# Patient Record
Sex: FEMALE | Race: WHITE | URBAN - METROPOLITAN AREA
[De-identification: names, ages, dates, MRNs, and addresses within clinical notes are randomized per-mention and may not be internally consistent; named-entity substitution may affect disease eponyms.]

---

## 2022-01-04 ENCOUNTER — OFFICE VISIT (OUTPATIENT)
Dept: GERIATRIC MEDICINE | Age: 77
End: 2022-01-04
Payer: MEDICARE

## 2022-01-04 DIAGNOSIS — E03.9 HYPOTHYROIDISM, UNSPECIFIED TYPE: ICD-10-CM

## 2022-01-04 DIAGNOSIS — J45.909 UNCOMPLICATED ASTHMA, UNSPECIFIED ASTHMA SEVERITY, UNSPECIFIED WHETHER PERSISTENT: Primary | ICD-10-CM

## 2022-01-04 DIAGNOSIS — M47.9 SPONDYLOSIS: ICD-10-CM

## 2022-01-04 DIAGNOSIS — E78.5 HYPERLIPIDEMIA, UNSPECIFIED HYPERLIPIDEMIA TYPE: ICD-10-CM

## 2022-01-04 DIAGNOSIS — I10 PRIMARY HYPERTENSION: ICD-10-CM
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
## 2022-01-04 DIAGNOSIS — R13.10 DYSPHAGIA, UNSPECIFIED TYPE: ICD-10-CM
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
## 2022-01-04 DIAGNOSIS — K11.7 XEROSTOMIA: ICD-10-CM

## 2022-01-04 PROCEDURE — G8421 BMI NOT CALCULATED: HCPCS | Performed by: NURSE PRACTITIONER

## 2022-01-04 PROCEDURE — G8484 FLU IMMUNIZE NO ADMIN: HCPCS | Performed by: NURSE PRACTITIONER

## 2022-01-04 PROCEDURE — 1090F PRES/ABSN URINE INCON ASSESS: CPT | Performed by: NURSE PRACTITIONER

## 2022-01-04 PROCEDURE — 1123F ACP DISCUSS/DSCN MKR DOCD: CPT | Performed by: NURSE PRACTITIONER

## 2022-01-05 LAB
A/G RATIO: NORMAL
ALBUMIN SERPL-MCNC: 2.9 G/DL
ALP BLD-CCNC: 98 U/L
ALT SERPL-CCNC: 12 U/L
AST SERPL-CCNC: 28 U/L
AVERAGE GLUCOSE: NORMAL
B-TYPE NATRIURETIC PEPTIDE: 2002 PG/ML
BILIRUB SERPL-MCNC: 0.6 MG/DL (ref 0.1–1.4)
BILIRUBIN DIRECT: NORMAL
BILIRUBIN, INDIRECT: NORMAL
BUN BLDV-MCNC: 40 MG/DL
CALCIUM SERPL-MCNC: 9.3 MG/DL
CHLORIDE BLD-SCNC: 102 MMOL/L
CHOLESTEROL, TOTAL: 67 MG/DL
CHOLESTEROL/HDL RATIO: ABNORMAL
CO2: 17 MMOL/L
CREAT SERPL-MCNC: 1.32 MG/DL
GFR CALCULATED: 38.9
GLOBULIN: 3.8
GLUCOSE BLD-MCNC: 78 MG/DL
HBA1C MFR BLD: 4.6 %
HDLC SERPL-MCNC: 23 MG/DL (ref 35–70)
LDL CHOLESTEROL CALCULATED: 28 MG/DL (ref 0–160)
NONHDLC SERPL-MCNC: ABNORMAL MG/DL
POTASSIUM SERPL-SCNC: 3.4 MMOL/L
PROTEIN TOTAL: 6.7 G/DL
SODIUM BLD-SCNC: 135 MMOL/L
TRIGL SERPL-MCNC: 79 MG/DL
TSH SERPL DL<=0.05 MIU/L-ACNC: 0.01 UIU/ML
VLDLC SERPL CALC-MCNC: ABNORMAL MG/DL

## 2022-01-06 RX ORDER — OMEPRAZOLE 20 MG/1
20 CAPSULE, DELAYED RELEASE ORAL DAILY
COMMUNITY
End: 2022-01-07 | Stop reason: SDUPTHER

## 2022-01-06 RX ORDER — LORATADINE 10 MG/1
10 TABLET ORAL DAILY
COMMUNITY

## 2022-01-06 RX ORDER — METOLAZONE 5 MG/1
5 TABLET ORAL SEE ADMIN INSTRUCTIONS
COMMUNITY
End: 2022-01-19

## 2022-01-06 RX ORDER — AZELASTINE HYDROCHLORIDE 137 UG/1
2 SPRAY, METERED NASAL 2 TIMES DAILY
COMMUNITY
End: 2022-01-19

## 2022-01-06 RX ORDER — BUDESONIDE AND FORMOTEROL FUMARATE DIHYDRATE 160; 4.5 UG/1; UG/1
2 AEROSOL RESPIRATORY (INHALATION) 2 TIMES DAILY
COMMUNITY

## 2022-01-06 RX ORDER — VERAPAMIL HYDROCHLORIDE 240 MG/1
240 TABLET, FILM COATED, EXTENDED RELEASE ORAL NIGHTLY
COMMUNITY
End: 2022-01-19

## 2022-01-06 RX ORDER — SALIVA STIMULANT COMB. NO.3
10 SPRAY, NON-AEROSOL (ML) MUCOUS MEMBRANE DAILY PRN
COMMUNITY

## 2022-01-06 RX ORDER — ZINC GLUCONATE 50 MG
50 TABLET ORAL DAILY
COMMUNITY

## 2022-01-06 RX ORDER — ALBUTEROL SULFATE 90 UG/1
2 INHALANT RESPIRATORY (INHALATION) EVERY 4 HOURS PRN
COMMUNITY
End: 2022-01-19

## 2022-01-06 RX ORDER — LANOLIN ALCOHOL/MO/W.PET/CERES
1000 CREAM (GRAM) TOPICAL DAILY
COMMUNITY

## 2022-01-06 RX ORDER — HYDROCODONE BITARTRATE AND ACETAMINOPHEN 5; 325 MG/1; MG/1
1 TABLET ORAL EVERY 6 HOURS PRN
COMMUNITY
End: 2022-01-19

## 2022-01-06 RX ORDER — SENNOSIDES 8.6 MG
1300 CAPSULE ORAL DAILY
COMMUNITY

## 2022-01-06 RX ORDER — DULOXETIN HYDROCHLORIDE 60 MG/1
60 CAPSULE, DELAYED RELEASE ORAL DAILY
COMMUNITY
End: 2022-01-07 | Stop reason: SDUPTHER

## 2022-01-06 RX ORDER — FUROSEMIDE 40 MG/1
40 TABLET ORAL DAILY
COMMUNITY

## 2022-01-06 RX ORDER — BACLOFEN 10 MG/1
10 TABLET ORAL DAILY
COMMUNITY

## 2022-01-06 RX ORDER — VALSARTAN AND HYDROCHLOROTHIAZIDE 80; 12.5 MG/1; MG/1
1 TABLET, FILM COATED ORAL DAILY
COMMUNITY
End: 2022-01-19

## 2022-01-06 RX ORDER — ELECTROLYTES/DEXTROSE
1 SOLUTION, ORAL ORAL DAILY
COMMUNITY
End: 2022-01-19

## 2022-01-06 RX ORDER — IBUPROFEN 400 MG/1
400 TABLET ORAL DAILY
COMMUNITY
End: 2022-01-19

## 2022-01-06 RX ORDER — LEVOTHYROXINE SODIUM 0.1 MG/1
137 TABLET ORAL DAILY
COMMUNITY

## 2022-01-07 DIAGNOSIS — K11.7 XEROSTOMIA: ICD-10-CM

## 2022-01-07 DIAGNOSIS — F32.9 MAJOR DEPRESSIVE DISORDER, REMISSION STATUS UNSPECIFIED, UNSPECIFIED WHETHER RECURRENT: Primary | ICD-10-CM

## 2022-01-07 DIAGNOSIS — K21.9 GASTROESOPHAGEAL REFLUX DISEASE, UNSPECIFIED WHETHER ESOPHAGITIS PRESENT: ICD-10-CM

## 2022-01-07 LAB
HCT VFR BLD CALC: 28.8 % (ref 37–47)
HEMOGLOBIN: 9.4 G/DL (ref 12–16)
MCH RBC QN AUTO: 27.1 PG (ref 27–31.3)
MCHC RBC AUTO-ENTMCNC: 32.6 % (ref 33–37)
MCV RBC AUTO: 83.3 FL (ref 82–100)
PDW BLD-RTO: 17.3 % (ref 11.5–14.5)
PLATELET # BLD: 194 K/UL (ref 130–400)
RBC # BLD: 3.46 M/UL (ref 4.2–5.4)
WBC # BLD: 5 K/UL (ref 4.8–10.8)

## 2022-01-07 RX ORDER — OMEPRAZOLE 20 MG/1
20 CAPSULE, DELAYED RELEASE ORAL DAILY
Qty: 120 CAPSULE | Refills: 0 | Status: SHIPPED | OUTPATIENT
Start: 2022-01-07 | End: 2022-05-07

## 2022-01-07 RX ORDER — DULOXETIN HYDROCHLORIDE 60 MG/1
60 CAPSULE, DELAYED RELEASE ORAL DAILY
Qty: 120 CAPSULE | Refills: 0 | Status: SHIPPED | OUTPATIENT
Start: 2022-01-07 | End: 2022-05-07

## 2022-01-07 RX ORDER — SALIVA STIMULANT COMB. NO.3
SPRAY, NON-AEROSOL (ML) MUCOUS MEMBRANE
Qty: 1 EACH | Refills: 3 | Status: SHIPPED | OUTPATIENT
Start: 2022-01-07 | End: 2022-01-19

## 2022-01-08 LAB
VITAMIN B-12: 1146 PG/ML (ref 232–1245)
VITAMIN D 25-HYDROXY: 36.9 NG/ML (ref 30–100)

## 2022-01-11 PROBLEM — R13.10 DYSPHAGIA: Status: ACTIVE | Noted: 2022-01-11

## 2022-01-11 PROBLEM — E03.9 HYPOTHYROIDISM: Status: ACTIVE | Noted: 2022-01-11

## 2022-01-11 PROBLEM — K11.7 XEROSTOMIA: Status: ACTIVE | Noted: 2022-01-11

## 2022-01-11 PROBLEM — M47.9 SPONDYLOSIS: Status: ACTIVE | Noted: 2022-01-11

## 2022-01-11 PROBLEM — E78.5 HYPERLIPIDEMIA: Status: ACTIVE | Noted: 2022-01-11

## 2022-01-11 PROBLEM — J45.909 UNCOMPLICATED ASTHMA: Status: ACTIVE | Noted: 2022-01-11
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
## 2022-01-11 PROBLEM — I10 PRIMARY HYPERTENSION: Status: ACTIVE | Noted: 2022-01-11

## 2022-01-11 NOTE — PROGRESS NOTES
[address] [address]  Chief Complaint   Patient presents with    Other     F/U New Admission       The patient is being seen today for followup after recent admission to our service with primary diagnosis of asthma, hypothyroidism, essential hypertension, hyperlipidemia, spondylosis, and fall on 12/15. SUBJECTIVE:  Resident states she did not sustain an injury as a result of her fall, and she is doing well. She states she does get confused with all the medications she has to take. She does try and write them out and use the pill reminder box; however, she still finds them confusing. It is to be noted that many of the patient's medications that she is taking are not in the original prescription bottles. When I do inquire where the prescription bottles are in which the medications came in, the resident states she does not know. Otherwise, no concerns. FAMILY AND SOCIAL HISTORY:  Refer to H and P. Past Medical History:   Diagnosis Date    Hyperlipidemia     Hypertension     Hyperthyroidism     Spondylosis of lumbar spine        ALLERGIES:  Savella (milnacipran). MEDICATIONS:  1. Acetaminophen 8-hour extended-release tab, 650 mg 2 tabs p.o. daily. 2.   Azelastine hydrochloride solution 137 mcg spray, 2 sprays both nostrils b.i.d.  3.   Baclofen 10 mg tab p.o. daily. 4.   Calcium 600 mg tablet, 600-400 mg-unit 1 tab p.o. t.i.d.  5.   Central-Jaiime tablet, 1 tab p.o. daily. 6.   Cymbalta capsule, delayed release, 60 mg capsule p.o. daily. 7.   Diovan HCT tab, 80 mg-12.5 mg tab p.o. daily. 8.   Glucosamine-chondroitin tab, 500-400 mg 1 tab p.o. daily. 9.   Ibuprofen 400 mg tab p.o. daily. 10.  Lasix 40 mg tab p.o. daily. 11.  Levothyroxine sodium 137 mcg tab p.o. daily. 12.  Loratadine 10 mg tab p.o. daily. 13.  Metolazone 5 mg tab p.o. every Monday, Wednesday, and Friday for edema. 14.  Norco 5/325 tab p.o. q.6 p.r.n. pain.   15.  Omeprazole delayed-release capsule, 20 mg capsule p.o. daily. 16.  ProAir HFA aerosol solution 108 (90 base) mcg/act 2-puff inhalation orally q.4 p.r.n.  17.  RA Central-Jaimie tablet, 1 tab p.o. daily. 18.  Symbicort aerosol 160/4.5 mcg/act 2-puff inhalation orally b.i.d. 19.  Verapamil hydrochloride extended-release tab, 240 mg tab p.o. daily. 20.  Vitamin A tab 3000 mcg p.o. daily. 21.  Vitamin B12 tab 1000 mcg p.o. daily. 22.  Vitamin D3 tab 5000 international units p.o. daily. 23.  Zinc 50 mg tab p.o. daily. REVIEW OF SYSTEMS:  Resident denies any headache, dizziness, or blurred vision. She denies any fever, chills, sore throat, or cough. She does report having a dry mouth. She denies any chest pain, chest discomfort, or chest palpitations. She denies any abdominal pain, nausea, vomiting, or heartburn. She denies any urinary frequency, urgency, or dysuria. She denies any constipation or diarrhea. She states she is sleeping okay and eating okay. She reports she does have edema to her bilateral lower extremities, but that is chronic. She reports she does lately have difficulty swallowing her food. It feels like it gets stuck. Otherwise, no concerns. PE:  Most recent vital signs:  /67, temp 97.8, heart rate 89, respirations 20, and pulse oxing 99% on room air. Constitutional:  Resident is alert, elderly female, in no apparent distress. Somewhat anxious at her baseline. Cooperative with exam.  Integ:  Pale pink, warm, dry. No visible rashes, bruises or open areas. HEENT:  Normocephalic, atraumatic. Conjunctivae pink. Sclerae nonicteric. Mucous membranes pink, moist.  No oropharyngeal exudate. Neck:  Supple. No cervical or clavicular lymphadenopathy. Cardiovascular:  Heart rate regular rate and rhythm. No murmurs, gallops, or rubs noted. Respiratory:  Lung sounds clear through all fields. Respirations even and nonlabored. No use of accessory muscles with breathing. Abdomen:  Obese, soft, nontender, and nondistended. monitor the resident for overall comfort, function, and safety. Return in about 3 months (around 4/4/2022), or if symptoms worsen or fail to improve, for chronic conditions. Please note this report is partially produced by using speech recognition hardware. It may contain errors related to the system, including grammar, punctuation and spelling as well as words and phrases that may seem inaccurate. For any questions or concerns feel free to contact me for clarification           Electronically Signed By: Almyra Offer. Rocco Nageotte, CNP on 01/10/2022 04:16:00  ______________________________  Almyra Offer. Rocco Nageotte, CNP  [id number]  D: 01/09/2022 17:07:06  T: 01/09/2022 18:59:23    cc: - [facility]

## 2022-01-19 RX ORDER — MAGNESIUM OXIDE 400 MG/1
400 TABLET ORAL 2 TIMES DAILY
COMMUNITY

## 2022-01-19 RX ORDER — METOPROLOL TARTRATE 50 MG/1
50 TABLET, FILM COATED ORAL 2 TIMES DAILY
COMMUNITY

## 2022-01-19 RX ORDER — TAMSULOSIN HYDROCHLORIDE 0.4 MG/1
0.4 CAPSULE ORAL DAILY
COMMUNITY
Start: 2022-01-18 | End: 2022-02-14

## 2022-01-19 RX ORDER — PREDNISONE 10 MG/1
10 TABLET ORAL DAILY
COMMUNITY
Start: 2022-01-18 | End: 2022-01-24

## 2022-01-22 LAB — C DIFF TOXIN/ANTIGEN: NORMAL

## 2022-01-23 ENCOUNTER — OFFICE VISIT (OUTPATIENT)
Dept: GERIATRIC MEDICINE | Age: 77
End: 2022-01-23
Payer: MEDICARE

## 2022-01-23 DIAGNOSIS — J45.909 UNCOMPLICATED ASTHMA, UNSPECIFIED ASTHMA SEVERITY, UNSPECIFIED WHETHER PERSISTENT: ICD-10-CM

## 2022-01-23 DIAGNOSIS — K21.9 GASTROESOPHAGEAL REFLUX DISEASE, UNSPECIFIED WHETHER ESOPHAGITIS PRESENT: ICD-10-CM

## 2022-01-23 DIAGNOSIS — N18.9 CHRONIC KIDNEY DISEASE, UNSPECIFIED CKD STAGE: ICD-10-CM

## 2022-01-23 DIAGNOSIS — F32.9 MAJOR DEPRESSIVE DISORDER, REMISSION STATUS UNSPECIFIED, UNSPECIFIED WHETHER RECURRENT: ICD-10-CM

## 2022-01-23 DIAGNOSIS — E03.9 HYPOTHYROIDISM, UNSPECIFIED TYPE: ICD-10-CM

## 2022-01-23 DIAGNOSIS — I50.9 CONGESTIVE HEART FAILURE, UNSPECIFIED HF CHRONICITY, UNSPECIFIED HEART FAILURE TYPE (HCC): ICD-10-CM

## 2022-01-23 DIAGNOSIS — R60.9 EDEMA, UNSPECIFIED TYPE: ICD-10-CM

## 2022-01-23 DIAGNOSIS — I10 PRIMARY HYPERTENSION: ICD-10-CM

## 2022-01-23 DIAGNOSIS — I48.91 ATRIAL FIBRILLATION, UNSPECIFIED TYPE (HCC): Primary | ICD-10-CM

## 2022-01-23 PROCEDURE — 1123F ACP DISCUSS/DSCN MKR DOCD: CPT | Performed by: NURSE PRACTITIONER

## 2022-01-23 PROCEDURE — 99309 SBSQ NF CARE MODERATE MDM 30: CPT | Performed by: NURSE PRACTITIONER

## 2022-01-23 PROCEDURE — G8484 FLU IMMUNIZE NO ADMIN: HCPCS | Performed by: NURSE PRACTITIONER

## 2022-01-24 LAB
ANION GAP SERPL CALCULATED.3IONS-SCNC: 14 MEQ/L (ref 9–15)
BASOPHILS ABSOLUTE: 0 K/UL (ref 0–0.2)
BASOPHILS RELATIVE PERCENT: 0.4 %
BUN BLDV-MCNC: 14 MG/DL (ref 8–23)
CALCIUM SERPL-MCNC: 10 MG/DL (ref 8.5–9.9)
CHLORIDE BLD-SCNC: 102 MEQ/L (ref 95–107)
CO2: 22 MEQ/L (ref 20–31)
CREAT SERPL-MCNC: 1.1 MG/DL (ref 0.5–0.9)
EOSINOPHILS ABSOLUTE: 0.2 K/UL (ref 0–0.7)
EOSINOPHILS RELATIVE PERCENT: 1.7 %
GFR AFRICAN AMERICAN: 58.1
GFR NON-AFRICAN AMERICAN: 48
GLUCOSE BLD-MCNC: 81 MG/DL (ref 70–99)
HCT VFR BLD CALC: 33.1 % (ref 37–47)
HEMOGLOBIN: 10.7 G/DL (ref 12–16)
LYMPHOCYTES ABSOLUTE: 1.1 K/UL (ref 1–4.8)
LYMPHOCYTES RELATIVE PERCENT: 10.8 %
MCH RBC QN AUTO: 26.5 PG (ref 27–31.3)
MCHC RBC AUTO-ENTMCNC: 32.3 % (ref 33–37)
MCV RBC AUTO: 81.9 FL (ref 82–100)
MONOCYTES ABSOLUTE: 0.6 K/UL (ref 0.2–0.8)
MONOCYTES RELATIVE PERCENT: 5.6 %
NEUTROPHILS ABSOLUTE: 8.4 K/UL (ref 1.4–6.5)
NEUTROPHILS RELATIVE PERCENT: 81.5 %
PDW BLD-RTO: 18.2 % (ref 11.5–14.5)
PLATELET # BLD: 181 K/UL (ref 130–400)
POTASSIUM SERPL-SCNC: 4.5 MEQ/L (ref 3.4–4.9)
RBC # BLD: 4.05 M/UL (ref 4.2–5.4)
SODIUM BLD-SCNC: 138 MEQ/L (ref 135–144)
TSH SERPL DL<=0.05 MIU/L-ACNC: 1.33 UIU/ML (ref 0.44–3.86)
WBC # BLD: 10.3 K/UL (ref 4.8–10.8)

## 2022-01-25 VITALS
HEART RATE: 78 BPM | RESPIRATION RATE: 18 BRPM | WEIGHT: 240 LBS | OXYGEN SATURATION: 97 % | TEMPERATURE: 97.6 F | DIASTOLIC BLOOD PRESSURE: 74 MMHG | SYSTOLIC BLOOD PRESSURE: 124 MMHG

## 2022-01-25 NOTE — PROGRESS NOTES
PATIENT:  Clifton Paula    :  1945    DOS:  2022    [address] [facility]  Chief Complaint   Patient presents with    Follow-Up from [address] [place] [address] Road:  The patient is being seen today for followup after recent admission to this facility with primary diagnoses of AFib with RVR, asthma, major depressive disorder, essential hypertension, CKD, heart failure, GERD, hypothyroidism, and edema. SUBJECTIVE:  Resident offers no concerns. States she would like to go back to her apartment because then she can take her pills the way she wants to and she feels that she moves around more when she is at home. Otherwise, no concerns. FAMILY AND SOCIAL HISTORY:  Refer to H&P. Past Medical History:   Diagnosis Date    Hyperlipidemia     Hypertension     Hyperthyroidism     Spondylosis of lumbar spine      . No family history on file. Social History     Socioeconomic History    Marital status:      Spouse name: Not on file    Number of children: Not on file    Years of education: Not on file    Highest education level: Not on file   Occupational History    Not on file   Tobacco Use    Smoking status: Unknown If Ever Smoked    Smokeless tobacco: Not on file   Substance and Sexual Activity    Alcohol use: Not on file    Drug use: Not on file    Sexual activity: Not on file   Other Topics Concern    Not on file   Social History Narrative    Not on file     Social Determinants of Health     Financial Resource Strain:     Difficulty of Paying Living Expenses: Not on file   Food Insecurity:     Worried About Running Out of Food in the Last Year: Not on file    Shaye of Food in the Last Year: Not on file   Transportation Needs:     Lack of Transportation (Medical): Not on file    Lack of Transportation (Non-Medical):  Not on file   Physical Activity:     Days of Exercise per Week: Not on file    Minutes of Exercise per Session: Not on file   Stress:     Feeling of Stress : Not on file   Social Connections:     Frequency of Communication with Friends and Family: Not on file    Frequency of Social Gatherings with Friends and Family: Not on file    Attends Adventism Services: Not on file    Active Member of Clubs or Organizations: Not on file    Attends Club or Organization Meetings: Not on file    Marital Status: Not on file   Intimate Partner Violence:     Fear of Current or Ex-Partner: Not on file    Emotionally Abused: Not on file    Physically Abused: Not on file    Sexually Abused: Not on file   Housing Stability:     Unable to Pay for Housing in the Last Year: Not on file    Number of [place] in the Last Year: Not on file    Unstable Housing in the Last Year: Not on file       ALLERGIES:  [address] MEDICATIONS:  Acetaminophen 650 mg p.o. daily; baclofen 10 mg p.o. daily; Biotene dry mouth lozenges 10 mL p.o. b.i.d.; calcium 600 mg plus D plus mineral tablet 600-400 mg tablet p.o. daily; Central-Jaimie tablet 1 tablet p.o. daily; Dulcolax suppository 10 mg rectal suppository daily p.r.n., duloxetine HCl delayed release capsule 60 mg capsule p.o. daily; Flomax 0.4 mg capsule p.o. daily x4 weeks, furosemide 40 mg tablet p.o. daily; glucosamine 1 capsule p.o. daily; levothyroxine sodium tablet 100 mcg p.o. daily; loratadine 10 mg tablet p.o. daily; Mag-Ox 400 mg tablet p.o. daily; metoprolol tartrate 50 mg p.o. daily; omeprazole 20 mg delayed release tablet p.o. daily; prednisone 10 mg tablet 1 tablet p.o. for inflammation once a day, 20 mg daily for 3 days, then 10 mg for 3 days, then DC; Symbicort aerosol 160-4.5 mcg/ACT 2 puffs inhalation orally b.i.d.; vitamin A tablet 10,000 IU p.o. daily; vitamin B12 tablet 1000 mcg p.o. daily; vitamin D3 tablet 125 mcg p.o. daily; zinc sulfate 140 mg tablet (50 Zn) 1 tablet p.o. daily. REVIEW OF SYSTEMS:  Resident denies any headache, dizziness, blurred vision, chest pain, or shortness of breath.   She denies any fever, chills, or sore throat. She denies any rashes. She does have bruises on her bilateral upper extremities from lab draws and IVs.  She denies any open areas. She denies any chest pain or chest discomfort. She does report that she can feel palpitations intermittently. She denies any abdominal pain, nausea, vomiting, or changes in her bowel or bladder habits. She reports she is eating well, sleeping well. She has no pain. She does have chronic edema to her bilateral lower extremities, which she feels has increased because she cannot move around like she normally does at her apartment. Her only other concern is she would like to go home back to her apartment. PHYSICAL EXAMINATION:  Most recent vital signs:  /74, temperature 97.6, heart rate 78, respirations 18, pulse oxing 97% on room air, weight 240. CONSTITUTIONAL:  Resident is alert and oriented x3, but forgetful female, in no apparent distress, pleasant and cooperative with examination. INTEGUMENT:  Pink, warm, dry. No rashes or open areas visible. She does have bruises to her bilateral upper extremities of various stages of healing. HEENT:  Normocephalic, atraumatic. External ears within normal limits. Hearing adequate for stated age. Conjunctivae pink. Sclerae nonicteric. Mucous membranes pink, moist.  No oropharyngeal exudates. NECK:  Supple. No cervical or clavicular lymphadenopathy. CARDIOVASCULAR:  Heart rate is regular rate and rhythm. No murmurs, gallops, or rubs noted. RESPIRATORY:  Lung sounds are clear throughout all fields. Respirations even, nonlabored. No use of accessory muscles with breathing. She is currently pulse oxing 97% on room air. ABDOMEN:  Soft, obese, nontender, nondistended. Normoactive bowel sounds. No masses noted. MUSCULOSKELETAL:  No muscle tenderness. No joint discomfort. PV:  Peripheral pulses present.   She does have 1 to 2+ pitting bipedal edema and lymphedema of her bilateral lower extremities. PSYCHOLOGICAL: Mood stable. Affect pleasant. Speech normal rate and tone. ASSESSMENT AND PLAN:  1.   AFib. Resident is currently in a regular rhythm. She has not had any chest pain or chest discomfort. She is on metoprolol for rate control that has been effective for her. She does report intermittently she will feel palpitations, but she is not having any chest discomfort. 2.   Asthma. Resident's respiratory status has been stable since her admission to this facility. We will continue her Symbicort as ordered. 3.   Major depressive disorder. Resident has not had any depressive episodes. We will continue her duloxetine as ordered. 4.   Essential hypertension. Resident's blood pressure is ranging anywhere from 120s to 130s/60s to 70s. She is currently not on any antihypertensive medications. 5.   CKD. Resident's BUN is 26, creatinine is 0.98. We will continue to monitor closely. 6.   Heart failure. Resident does have pitting edema to her feet which she states is new since she has come here. She is on furosemide. I will order ACE wraps for her bilateral lower extremities from toes to knees on in a.m. and off in the p.m. Patient reports she has tried RADHA hose in the past and they were cutting into her skin so she is requesting not to have those again. 7.   GERD. Resident is tolerating her prescribed diet. We will continue her omeprazole as ordered. 8.   Hypothyroidism. Resident's TSH is 0.01. We will continue her Synthroid as ordered. She will continue to follow up with Dr. Loki Gonzales from endocrinology. 9.   Edema. I will order ACE wraps for the resident's bilateral lower extremities knee high on in a.m. and off in the p.m. I have reviewed this resident's medication and treatment plan, as well as, most recent hospital records. I will order BMP, CBC with diff, TSH, vitamin D level in the a.m.   I will have nursing staff do discharge planning as the patient wants to go back to her apartment when she is safely able to. No further changes will be made at this time. Return in about 1 week (around 1/30/2022), or if symptoms worsen or fail to improve. Please note this report is partially produced by using speech recognition hardware. It may contain errors related to the system, including grammar, punctuation and spelling as well as words and phrases that may seem inaccurate. For any questions or concerns feel free to contact me for clarification           Electronically Signed By: Lizeth Reynolds CNP on 01/24/2022 08:22:39  ______________________________  Lizeth Reynolds CNP  [id number]  D: 01/23/2022 51:91:36  T: 01/23/2022 19:30:37    cc: - [facility]

## 2022-01-26 ENCOUNTER — OFFICE VISIT (OUTPATIENT)
Dept: GERIATRIC MEDICINE | Age: 77
End: 2022-01-26
Payer: MEDICARE

## 2022-01-26 DIAGNOSIS — I50.22 CHRONIC SYSTOLIC CONGESTIVE HEART FAILURE (HCC): Primary | ICD-10-CM

## 2022-01-26 DIAGNOSIS — I10 PRIMARY HYPERTENSION: ICD-10-CM

## 2022-01-26 DIAGNOSIS — I48.20 CHRONIC ATRIAL FIBRILLATION (HCC): ICD-10-CM

## 2022-01-26 DIAGNOSIS — R53.1 WEAKNESS: ICD-10-CM

## 2022-01-26 PROCEDURE — 99304 1ST NF CARE SF/LOW MDM 25: CPT | Performed by: INTERNAL MEDICINE

## 2022-01-26 PROCEDURE — 1123F ACP DISCUSS/DSCN MKR DOCD: CPT | Performed by: INTERNAL MEDICINE

## 2022-01-26 PROCEDURE — G8484 FLU IMMUNIZE NO ADMIN: HCPCS | Performed by: INTERNAL MEDICINE

## 2022-01-28 DIAGNOSIS — G89.4 CHRONIC PAIN SYNDROME: Primary | ICD-10-CM

## 2022-01-28 PROBLEM — K21.9 GASTROESOPHAGEAL REFLUX DISEASE: Status: ACTIVE | Noted: 2022-01-28

## 2022-01-28 PROBLEM — I48.91 ATRIAL FIBRILLATION (HCC): Status: ACTIVE | Noted: 2022-01-28

## 2022-01-28 PROBLEM — F32.9 MAJOR DEPRESSIVE DISORDER: Status: ACTIVE | Noted: 2022-01-28

## 2022-01-28 PROBLEM — R60.9 EDEMA: Status: ACTIVE | Noted: 2022-01-28

## 2022-01-28 PROBLEM — I50.9 CONGESTIVE HEART FAILURE (HCC): Status: ACTIVE | Noted: 2022-01-28

## 2022-01-28 PROBLEM — N18.9 CHRONIC KIDNEY DISEASE: Status: ACTIVE | Noted: 2022-01-28

## 2022-01-28 RX ORDER — HYDROCODONE BITARTRATE AND ACETAMINOPHEN 5; 325 MG/1; MG/1
1 TABLET ORAL EVERY 6 HOURS PRN
Qty: 120 TABLET | Refills: 0 | Status: SHIPPED | OUTPATIENT
Start: 2022-01-28 | End: 2022-02-27

## 2022-02-01 LAB
BACTERIA: ABNORMAL /HPF
BILIRUBIN URINE: NEGATIVE
BLOOD, URINE: ABNORMAL
CLARITY: CLEAR
COLOR: YELLOW
CRYSTALS, UA: ABNORMAL /HPF
EPITHELIAL CELLS, UA: ABNORMAL /HPF
GLUCOSE URINE: NEGATIVE MG/DL
KETONES, URINE: NEGATIVE MG/DL
LEUKOCYTE ESTERASE, URINE: ABNORMAL
NITRITE, URINE: POSITIVE
PH UA: 5 (ref 5–9)
PROTEIN UA: NEGATIVE MG/DL
RBC UA: ABNORMAL /HPF (ref 0–2)
SPECIFIC GRAVITY UA: 1.01 (ref 1–1.03)
UROBILINOGEN, URINE: 0.2 E.U./DL
WBC UA: ABNORMAL /HPF (ref 0–5)

## 2022-02-03 ENCOUNTER — VIRTUAL VISIT (OUTPATIENT)
Dept: GERIATRIC MEDICINE | Age: 77
End: 2022-02-03
Payer: MEDICARE

## 2022-02-03 DIAGNOSIS — K52.9 CHRONIC DIARRHEA: Primary | ICD-10-CM

## 2022-02-03 DIAGNOSIS — G89.4 CHRONIC PAIN SYNDROME: ICD-10-CM

## 2022-02-03 DIAGNOSIS — N39.0 URINARY TRACT INFECTION WITHOUT HEMATURIA, SITE UNSPECIFIED: ICD-10-CM

## 2022-02-03 LAB
ORGANISM: ABNORMAL
URINE CULTURE, ROUTINE: ABNORMAL

## 2022-02-03 PROCEDURE — G8484 FLU IMMUNIZE NO ADMIN: HCPCS | Performed by: NURSE PRACTITIONER

## 2022-02-03 PROCEDURE — 1090F PRES/ABSN URINE INCON ASSESS: CPT | Performed by: NURSE PRACTITIONER

## 2022-02-03 PROCEDURE — G8421 BMI NOT CALCULATED: HCPCS | Performed by: NURSE PRACTITIONER

## 2022-02-03 PROCEDURE — 1123F ACP DISCUSS/DSCN MKR DOCD: CPT | Performed by: NURSE PRACTITIONER

## 2022-02-03 RX ORDER — DIPHENOXYLATE HYDROCHLORIDE AND ATROPINE SULFATE 2.5; .025 MG/1; MG/1
1 TABLET ORAL 4 TIMES DAILY PRN
Qty: 40 TABLET | Refills: 0 | Status: CANCELLED | OUTPATIENT
Start: 2022-02-03 | End: 2022-02-13

## 2022-02-03 RX ORDER — DIPHENOXYLATE HYDROCHLORIDE AND ATROPINE SULFATE 2.5; .025 MG/1; MG/1
1 TABLET ORAL 4 TIMES DAILY PRN
Status: CANCELLED | OUTPATIENT
Start: 2022-02-03 | End: 2022-02-13

## 2022-02-04 RX ORDER — DIPHENOXYLATE HYDROCHLORIDE AND ATROPINE SULFATE 2.5; .025 MG/1; MG/1
1 TABLET ORAL 4 TIMES DAILY PRN
Qty: 40 TABLET | Refills: 0 | Status: SHIPPED | OUTPATIENT
Start: 2022-02-04 | End: 2022-02-14

## 2022-02-05 LAB — TSH SERPL DL<=0.05 MIU/L-ACNC: 12.69 UIU/ML (ref 0.44–3.86)

## 2022-02-11 LAB — C DIFF TOXIN/ANTIGEN: NORMAL

## 2022-02-19 ENCOUNTER — OFFICE VISIT (OUTPATIENT)
Dept: GERIATRIC MEDICINE | Age: 77
End: 2022-02-19
Payer: MEDICARE

## 2022-02-19 VITALS
WEIGHT: 253.1 LBS | SYSTOLIC BLOOD PRESSURE: 132 MMHG | BODY MASS INDEX: 40.67 KG/M2 | DIASTOLIC BLOOD PRESSURE: 72 MMHG | RESPIRATION RATE: 17 BRPM | HEART RATE: 76 BPM | OXYGEN SATURATION: 96 % | HEIGHT: 66 IN | TEMPERATURE: 97.7 F

## 2022-02-19 DIAGNOSIS — Z71.89 ACP (ADVANCE CARE PLANNING): ICD-10-CM

## 2022-02-19 DIAGNOSIS — I50.9 CONGESTIVE HEART FAILURE, UNSPECIFIED HF CHRONICITY, UNSPECIFIED HEART FAILURE TYPE (HCC): ICD-10-CM

## 2022-02-19 DIAGNOSIS — R04.0 EPISTAXIS: ICD-10-CM

## 2022-02-19 DIAGNOSIS — Z00.00 INITIAL MEDICARE ANNUAL WELLNESS VISIT: Primary | ICD-10-CM

## 2022-02-19 PROCEDURE — 99497 ADVNCD CARE PLAN 30 MIN: CPT | Performed by: NURSE PRACTITIONER

## 2022-02-19 PROCEDURE — 4040F PNEUMOC VAC/ADMIN/RCVD: CPT | Performed by: NURSE PRACTITIONER

## 2022-02-19 PROCEDURE — 1123F ACP DISCUSS/DSCN MKR DOCD: CPT | Performed by: NURSE PRACTITIONER

## 2022-02-19 PROCEDURE — G8484 FLU IMMUNIZE NO ADMIN: HCPCS | Performed by: NURSE PRACTITIONER

## 2022-02-19 PROCEDURE — G0438 PPPS, INITIAL VISIT: HCPCS | Performed by: NURSE PRACTITIONER

## 2022-02-19 ASSESSMENT — LIFESTYLE VARIABLES
HOW OFTEN DO YOU HAVE A DRINK CONTAINING ALCOHOL: 2-4 TIMES A MONTH
HOW MANY STANDARD DRINKS CONTAINING ALCOHOL DO YOU HAVE ON A TYPICAL DAY: 1 OR 2

## 2022-02-19 ASSESSMENT — PATIENT HEALTH QUESTIONNAIRE - PHQ9
SUM OF ALL RESPONSES TO PHQ QUESTIONS 1-9: 0
SUM OF ALL RESPONSES TO PHQ QUESTIONS 1-9: 0
SUM OF ALL RESPONSES TO PHQ9 QUESTIONS 1 & 2: 0
SUM OF ALL RESPONSES TO PHQ QUESTIONS 1-9: 0
SUM OF ALL RESPONSES TO PHQ QUESTIONS 1-9: 0
2. FEELING DOWN, DEPRESSED OR HOPELESS: 0
1. LITTLE INTEREST OR PLEASURE IN DOING THINGS: 0

## 2022-02-19 NOTE — PATIENT INSTRUCTIONS
Personalized Preventive Plan for Jm Figueroa - 2/19/2022  Medicare offers a range of preventive health benefits. Some of the tests and screenings are paid in full while other may be subject to a deductible, co-insurance, and/or copay. Some of these benefits include a comprehensive review of your medical history including lifestyle, illnesses that may run in your family, and various assessments and screenings as appropriate. After reviewing your medical record and screening and assessments performed today your provider may have ordered immunizations, labs, imaging, and/or referrals for you. A list of these orders (if applicable) as well as your Preventive Care list are included within your After Visit Summary for your review. Other Preventive Recommendations:    · A preventive eye exam performed by an eye specialist is recommended every 1-2 years to screen for glaucoma; cataracts, macular degeneration, and other eye disorders. · A preventive dental visit is recommended every 6 months. · Try to get at least 150 minutes of exercise per week or 10,000 steps per day on a pedometer . · Order or download the FREE \"Exercise & Physical Activity: Your Everyday Guide\" from [employer]. Call 1-522.274.5052 or search [employer] online. · You need 8654-5752 mg of calcium and 9736-2389 IU of vitamin D per day. It is possible to meet your calcium requirement with diet alone, but a vitamin D supplement is usually necessary to meet this goal.  · When exposed to the sun, use a sunscreen that protects against both UVA and UVB radiation with an SPF of 30 or greater. Reapply every 2 to 3 hours or after sweating, drying off with a towel, or swimming. · Always wear a seat belt when traveling in a car. Always wear a helmet when riding a bicycle or motorcycle.

## 2022-02-19 NOTE — PROGRESS NOTES
Medicare Annual Wellness Visit    Brooklynn Urias is here for Medicare [address] was seen today for medicare awv. Diagnoses and all orders for this visit:    Initial Medicare annual wellness visit  -     [address] TO FACE, 1ST 30MIN [70422]    ACP (advance care planning)  -     [place] ADVANCED CARE PLAN FACE TO FACE, 1ST 30MIN [66284]    Congestive heart failure, unspecified HF chronicity, unspecified heart failure type ([address]  -     DC ADVANCED CARE PLAN FACE TO FACE, 1ST 30MIN [98807]    Epistaxis  -     DC ADVANCED CARE PLAN FACE  [facility] [id number]         Recommendations for Preventive Services Due: see orders and patient instructions/AVS.  Recommended screening schedule for the next 5-10 years is provided to the patient in written form: see Patient Instructions/AVS.     Return for Medicare Annual Wellness Visit in 1 year. Reviewed and updated this visit by clinical staff: Allergies  Meds  Problems       Subjective   The following acute and/or chronic problems were also addressed today:  CHF  Epistaxis    Patient's complete Health Risk Assessment and screening values have been reviewed and are found in Flowsheets. The following problems were reviewed today and where indicated follow up appointments were made and/or referrals ordered.     Positive Risk Factor Screenings with Interventions:    Fall Risk:  Do you feel unsteady or are you worried about falling? : (!) yes (Occasionally)  2 or more falls in past year?: (!) yes (Working with PT/OT now)  Fall with injury in past year?: (!) yes (has elbow pain s/p fall left elbow)     Fall Risk Interventions:    · Physical therapy referral ordered for strength and balance training              General Health and ACP:  General  In general, how would you say your health is?: Very Good  In the past 7 days, have you experienced any of the following: New or Increased Pain, New or Increased Fatigue, Loneliness, Social Isolation, Stress or Anger?: (!) Yes ( .  Normally a more social person)  Select all that apply: (!) Loneliness,Social Isolation  Do you get the social and emotional support that you need?: Yes  Do you have a Living Will?: Yes    Advance Directives     Power of Rosas Luciano Will ACP-Advance Directive ACP-Power of     Not on File Not on File Not on File Not on File      General Health Risk Interventions:  · Declines further intervention    Health Habits/Nutrition:     Physical Activity: Sufficiently Active    Days of Exercise per Week: 3 days    Minutes of Exercise per Session: 60 min     Have you lost any weight without trying in the past 3 months?: No    Body mass index: (!) 40.85    Have you seen the dentist within the past year?: (!) No (Would like to see dentist at this facility)      Health Habits/Nutrition Interventions:  · Would like an appointment with facility dentist    Hearing/Vision:  No exam data present  Hearing/Vision  Do you or your family notice any trouble with your hearing that hasn't been managed with hearing aids?: No  Do you have difficulty driving, watching TV, or doing any of your daily activities because of your eyesight?: No  Have you had an eye exam within the past year?: (!) No (Would like to see the facility eye doctor)    Hearing/Vision Interventions:  · would like vision appointment    Safety:  Do you have working smoke detectors?: Yes  Do you have any tripping hazards - loose or unsecured carpets or rugs?: No  Do you have any tripping hazards - clutter in doorways, halls, or stairs?: No  Do you have either shower bars, grab bars, non-slip mats or non-slip surfaces in your shower or bathtub?: Yes  Do all of your stairways have a railing or banister?: (!) No  Do you always fasten your seatbelt when you are in a car?: Yes    Safety Interventions:  · Patient declines any further evaluation/treatment for this issue    ADLs:  In the past 7 days, did you need help from others to perform any of the following everyday activities: Eating, dressing, grooming, bathing, toileting, or walking/balance?: No (uses walker)  In the past 7 days, did you need help from others to take care of any of the following: Laundry, housekeeping, banking/finances, shopping, telephone use, food preparation, transportation, or taking medications?: (!) Yes  Select all that apply: (!) Housekeeping,Food Preparation,Transportation,Taking Medications    ADL Interventions:  · Patient declines any further evaluation/treatment for this issue       Objective         General Appearance: alert and oriented to person, place and time, well-developed and well-nourished, in no acute distress  Skin: warm and dry, no rash or erythema  Head: normocephalic and atraumatic  Eyes: conjunctivae normal and sclera anicteric  ENT: hearing grossly normal bilaterally and oropharynx clear and moist with normal mucous membranes  Neck: neck supple and non tender without mass, no thyromegaly or thyroid nodules, no cervical lymphadenopathy   Pulmonary/Chest: clear to auscultation bilaterally- no wheezes, rales or rhonchi, normal air movement, no respiratory distress  Cardiovascular: normal rate, regular rhythm, normal S1 and S2, no murmurs, no gallops, intact distal pulses and no JVD  Abdomen: soft, non-tender, bowel sounds normal and no masses  Extremities: 1+ pitting edema to knee on rt. To thigh on left. No erythema, swelling or tenderness of extremities  Musculoskeletal: no swollen joints and no joint deformity  Neurologic: speech normal and gait abnormal- ambulates with wheeled walker        Allergies   Allergen Reactions    Savella [Milnacipran]      Prior to Visit Medications    Medication Sig Taking? Authorizing Provider   HYDROcodone-acetaminophen (NORCO) 5-325 MG per tablet Take 1 tablet by mouth every 6 hours as needed for Pain for up to 30 days. Intended supply: 3 days.  Take lowest dose possible to manage pain  Akua Damon Araseli Bass APRN - CNP   magnesium oxide (MAG-OX) 400 MG tablet Take 400 mg by mouth 2 times daily Indications: Nutritional Support  Historical Provider, MD   metoprolol tartrate (LOPRESSOR) 50 MG tablet Take 50 mg by mouth 2 times daily Indications: High Blood Pressure Disorder  Historical Provider, MD   tamsulosin (FLOMAX) 0.4 MG capsule Take 0.4 mg by mouth daily Indications: Urinary Retention Give  x4 weeks  Historical Provider, MD   DULoxetine (CYMBALTA) 60 MG extended release capsule Take 1 capsule by mouth daily  Patient taking differently: Take 60 mg by mouth daily Indications: Depression   Phlyicia Ariza APRN - CNP   omeprazole (PRILOSEC) 20 MG delayed release capsule Take 1 capsule by mouth daily  Patient taking differently: Take 20 mg by mouth daily Indications: Digestive Tract Discomfort   Phylicia Ariza APRN - CNP   acetaminophen (ACETAMINOPHEN 8 HOUR) 650 MG extended release tablet Take 1,300 mg by mouth daily Indications: Pain   Historical Provider, MD   baclofen (LIORESAL) 10 MG tablet Take 10 mg by mouth daily Indications: Muscle Spasm   Historical Provider, MD   Artificial Saliva (BIOTENE DRY MOUTH MOISTURIZING) SOLN liquid Take 10 mLs by mouth daily as needed Place and dissolve orally bid for dry mouth, swish and spit, do not swallow  Historical Provider, MD   calcium carbonate-vitamin D (CALCIUM 600+D3) 600-400 MG-UNIT TABS per tab Take 1 tablet by mouth 3 times daily Indications: Nutritional Support   Historical Provider, MD   Multiple Vitamins-Minerals (CENTRAVITES ADULTS PO) Take 1 tablet by mouth daily Indications: Nutritional Support   Historical Provider, MD   furosemide (LASIX) 40 MG tablet Take 40 mg by mouth daily Indications: Edema   Historical Provider, MD   levothyroxine (SYNTHROID) 100 MCG tablet Take 137 mcg by mouth Daily Indications: Underactive Thyroid   Historical Provider, MD   loratadine (CLARITIN) 10 MG tablet Take 10 mg by mouth daily Indications:  Allergy Historical Provider, MD   budesonide-formoterol (SYMBICORT) 160-4.5 MCG/ACT AERO Inhale 2 puffs into the lungs 2 times daily Indications: Difficulty Breathing   Historical Provider, MD   vitamin A 3 MG (76310 UT) capsule Take 10,000 Units by mouth daily Indications: Nutritional Support   Historical Provider, MD   vitamin B-12 (CYANOCOBALAMIN) 1000 MCG tablet Take 1,000 mcg by mouth daily Indications: Nutritional Support   Historical Provider, MD   Cholecalciferol (VITAMIN D3) 125 MCG (5000 UT) TABS Take 5,000 Units by mouth daily Indications: Nutritional Support   Historical Provider, MD   zinc 50 MG TABS tablet Take 50 mg by mouth daily Indications: Nutritional Support   Historical Provider, MD       CareTeam (Including outside providers/suppliers regularly involved in providing care):   Patient Care Team:  Estefanía Yañez MD as PCP - General (Internal Medicine)

## 2022-02-21 DIAGNOSIS — R19.7 DIARRHEA, UNSPECIFIED TYPE: Primary | ICD-10-CM

## 2022-02-21 LAB
ANION GAP SERPL CALCULATED.3IONS-SCNC: 14 MEQ/L (ref 9–15)
BASOPHILS ABSOLUTE: 0.1 K/UL (ref 0–0.2)
BASOPHILS RELATIVE PERCENT: 0.6 %
BUN BLDV-MCNC: 16 MG/DL (ref 8–23)
CALCIUM SERPL-MCNC: 9.9 MG/DL (ref 8.5–9.9)
CHLORIDE BLD-SCNC: 109 MEQ/L (ref 95–107)
CO2: 15 MEQ/L (ref 20–31)
CREAT SERPL-MCNC: 1.12 MG/DL (ref 0.5–0.9)
EOSINOPHILS ABSOLUTE: 0.2 K/UL (ref 0–0.7)
EOSINOPHILS RELATIVE PERCENT: 2.6 %
GFR AFRICAN AMERICAN: 56.9
GFR NON-AFRICAN AMERICAN: 47
GLUCOSE BLD-MCNC: 64 MG/DL (ref 70–99)
HCT VFR BLD CALC: 34.2 % (ref 37–47)
HEMOGLOBIN: 11.1 G/DL (ref 12–16)
LYMPHOCYTES ABSOLUTE: 2 K/UL (ref 1–4.8)
LYMPHOCYTES RELATIVE PERCENT: 25.2 %
MCH RBC QN AUTO: 26.7 PG (ref 27–31.3)
MCHC RBC AUTO-ENTMCNC: 32.5 % (ref 33–37)
MCV RBC AUTO: 82.3 FL (ref 82–100)
MONOCYTES ABSOLUTE: 0.7 K/UL (ref 0.2–0.8)
MONOCYTES RELATIVE PERCENT: 8.2 %
NEUTROPHILS ABSOLUTE: 5.1 K/UL (ref 1.4–6.5)
NEUTROPHILS RELATIVE PERCENT: 63.4 %
PDW BLD-RTO: 21 % (ref 11.5–14.5)
PLATELET # BLD: 224 K/UL (ref 130–400)
POTASSIUM SERPL-SCNC: 4.3 MEQ/L (ref 3.4–4.9)
RBC # BLD: 4.16 M/UL (ref 4.2–5.4)
SODIUM BLD-SCNC: 138 MEQ/L (ref 135–144)
WBC # BLD: 8.1 K/UL (ref 4.8–10.8)

## 2022-02-21 RX ORDER — DIPHENOXYLATE HYDROCHLORIDE AND ATROPINE SULFATE 2.5; .025 MG/1; MG/1
2 TABLET ORAL 4 TIMES DAILY PRN
Qty: 40 TABLET | Refills: 0 | Status: SHIPPED | OUTPATIENT
Start: 2022-02-21 | End: 2022-03-15 | Stop reason: SDUPTHER

## 2022-02-22 LAB
ANION GAP SERPL CALCULATED.3IONS-SCNC: 16 MEQ/L (ref 9–15)
BASOPHILS ABSOLUTE: 0.1 K/UL (ref 0–0.2)
BASOPHILS RELATIVE PERCENT: 0.8 %
BUN BLDV-MCNC: 15 MG/DL (ref 8–23)
CALCIUM SERPL-MCNC: 9.9 MG/DL (ref 8.5–9.9)
CHLORIDE BLD-SCNC: 111 MEQ/L (ref 95–107)
CO2: 17 MEQ/L (ref 20–31)
CREAT SERPL-MCNC: 1.28 MG/DL (ref 0.5–0.9)
EOSINOPHILS ABSOLUTE: 0.2 K/UL (ref 0–0.7)
EOSINOPHILS RELATIVE PERCENT: 3.1 %
GFR AFRICAN AMERICAN: 48.8
GFR NON-AFRICAN AMERICAN: 40.3
GLUCOSE BLD-MCNC: 85 MG/DL (ref 70–99)
HCT VFR BLD CALC: 33.9 % (ref 37–47)
HEMOGLOBIN: 10.8 G/DL (ref 12–16)
LYMPHOCYTES ABSOLUTE: 1.4 K/UL (ref 1–4.8)
LYMPHOCYTES RELATIVE PERCENT: 17.2 %
MCH RBC QN AUTO: 26.4 PG (ref 27–31.3)
MCHC RBC AUTO-ENTMCNC: 31.9 % (ref 33–37)
MCV RBC AUTO: 82.6 FL (ref 82–100)
MONOCYTES ABSOLUTE: 0.7 K/UL (ref 0.2–0.8)
MONOCYTES RELATIVE PERCENT: 9.3 %
NEUTROPHILS ABSOLUTE: 5.5 K/UL (ref 1.4–6.5)
NEUTROPHILS RELATIVE PERCENT: 69.6 %
PDW BLD-RTO: 21.3 % (ref 11.5–14.5)
PLATELET # BLD: 233 K/UL (ref 130–400)
POTASSIUM SERPL-SCNC: 3.6 MEQ/L (ref 3.4–4.9)
RBC # BLD: 4.1 M/UL (ref 4.2–5.4)
SODIUM BLD-SCNC: 144 MEQ/L (ref 135–144)
WBC # BLD: 7.9 K/UL (ref 4.8–10.8)

## 2022-02-24 PROBLEM — K52.9 CHRONIC DIARRHEA: Status: ACTIVE | Noted: 2022-02-24

## 2022-02-24 PROBLEM — G89.4 CHRONIC PAIN SYNDROME: Status: ACTIVE | Noted: 2022-02-24

## 2022-02-24 PROBLEM — N39.0 URINARY TRACT INFECTION WITHOUT HEMATURIA: Status: ACTIVE | Noted: 2022-02-24

## 2022-02-24 NOTE — PROGRESS NOTES
for dry mouth, swish and spit, do not swallow      calcium carbonate-vitamin D (CALCIUM 600+D3) 600-400 MG-UNIT TABS per tab Take 1 tablet by mouth 3 times daily Indications: Nutritional Support       Multiple Vitamins-Minerals (CENTRAVITES ADULTS PO) Take 1 tablet by mouth daily Indications: Nutritional Support       furosemide (LASIX) 40 MG tablet Take 40 mg by mouth daily Indications: Edema       levothyroxine (SYNTHROID) 100 MCG tablet Take 137 mcg by mouth Daily Indications: Underactive Thyroid       loratadine (CLARITIN) 10 MG tablet Take 10 mg by mouth daily Indications: Allergy       budesonide-formoterol (SYMBICORT) 160-4.5 MCG/ACT AERO Inhale 2 puffs into the lungs 2 times daily Indications: Difficulty Breathing       vitamin A 3 MG (52756 UT) capsule Take 10,000 Units by mouth daily Indications: Nutritional Support       vitamin B-12 (CYANOCOBALAMIN) 1000 MCG tablet Take 1,000 mcg by mouth daily Indications: Nutritional Support       Cholecalciferol (VITAMIN D3) 125 MCG (5000 UT) TABS Take 5,000 Units by mouth daily Indications: Nutritional Support       zinc 50 MG TABS tablet Take 50 mg by mouth daily Indications: Nutritional Support        No current facility-administered medications on file prior to visit. REVIEW OF SYSTEMS:  Resident denies any headache, dizziness, blurred vision. She denies any fever, chills, sore throat. She denies any rashes, bruises, or open areas. She denies any chest pain, chest discomfort, or heart palpitations. She denies any shortness of breath or cough. She denies any nausea, vomiting, or abdominal pain. She denies any urinary urgency, frequency, or dysuria. She denies any constipation. She does report persistent diarrhea. She states that the Lomotil is helping somewhat but she does continue to have diarrhea even with the Lomotil. She reports the episodes are so bad she is afraid to leave her apartment.   She states she is eating okay, sleeping okay, and she currently has no pain. She reports that the Norco is effective meeting her pain needs. Nursing staff reports that resident's urine did come back positive for UTI. PHYSICAL EXAMINATION:  Most recent vital signs: Blood pressure 136/68, temperature 97.6, heart rate 76, respirations 17, pulse ox 96% room air, weight 238. 3. Constitutional:  Resident is a 64 y.o. female alert,slightly anxious, pleasant, forgetful and cooperative with exam.  In no apparent distress. Integument:  Pink, dry. No visible rashes, bruises, or open areas. HEENT:  Normocephalic, atraumatic. External ears appear to be within normal limits. She is hard of hearing. Conjunctivae pink. Sclerae nonicteric. Mucous membranes pink, moist.  No oropharyngeal exudate. Neck:  No masses noted. Respiratory:  Respirations nonlabored. The patient is not using accessory muscles with breathing. Pulse ox 96% room air. Abdomen:  Obese. No masses noted. Musculoskeletal: Ambulates with walker. PV:  She  does have chronic nonpitting edema to her bilateral lower extremities. Psychological: Mood stable. Affect pleasant, slightly anxious. Speech normal rate and tone. ASSESSMENT AND PLAN:      Diagnosis Orders   1. Chronic diarrhea  diphenoxylate-atropine (DIPHENATOL) 2.5-0.025 MG per tablet   2. Chronic pain syndrome     3. Urinary tract infection without hematuria, site unspecified          1. We will DC the residents omeprazole. I have discussed with her that several of the medications she is on can contribute to her diarrhea. 2. Norco is effective in meeting the patient's pain needs. Continue working with PT and OT. 3. Cipro 500 mg p.o. twice daily x10 days. Acidophilus 1 tab p.o. 3 times daily x14 days. I have reviewed this resident's medication and treatment plan as well as most recent lab work in [address] [place] [address] and [facility]. Darwin Luo No further changes will be made at this time.       Return in about 3 months (around 5/3/2022), or if symptoms worsen or fail to improve, for chronic conditions. Please note this report is partially produced by using speech recognition hardware. It may contain errors related to the system, including grammar, punctuation and spelling as well as words and phrases that may seem inaccurate. For any questions or concerns feelv free to contact me for clarification    Adrien Roblero, was evaluated through a synchronous (real-time) audio-video encounter. The patient (or guardian if applicable) is aware that this is a billable service, which includes applicable co-pays. This Virtual Visit was conducted with patient's (and/or legal guardian's) consent. The visit was conducted pursuant to the emergency declaration under the [address] [place] [address], [address] [place] [address] authority and the [employer] and [employer] General Act. Patient identification was verified, and a caregiver was present when appropriate. The patient was located at home in a state where the provider was licensed to provide care. Total time spent for this encounter: Not billed by time    --CT Bill - CNP on 2/24/2022 at 11:31 PM    An electronic signature was used to authenticate this note. Electronically Signed By: Severiano Finn. Arturo Murray CNP on 2/24/22   ______________________________  Severiano Finn.  Marly FOFANA CNP

## 2022-02-25 ENCOUNTER — OFFICE VISIT (OUTPATIENT)
Dept: GASTROENTEROLOGY | Age: 77
End: 2022-02-25
Payer: MEDICARE

## 2022-02-25 VITALS — BODY MASS INDEX: 40.66 KG/M2 | HEIGHT: 66 IN | WEIGHT: 253 LBS

## 2022-02-25 DIAGNOSIS — R19.7 DIARRHEA, UNSPECIFIED TYPE: Primary | ICD-10-CM

## 2022-02-25 DIAGNOSIS — E66.01 OBESITY, CLASS III, BMI 40-49.9 (MORBID OBESITY) (HCC): ICD-10-CM

## 2022-02-25 PROCEDURE — 99204 OFFICE O/P NEW MOD 45 MIN: CPT | Performed by: SPECIALIST

## 2022-02-25 PROCEDURE — 1123F ACP DISCUSS/DSCN MKR DOCD: CPT | Performed by: SPECIALIST

## 2022-02-25 PROCEDURE — G8417 CALC BMI ABV UP PARAM F/U: HCPCS | Performed by: SPECIALIST

## 2022-02-25 PROCEDURE — 4040F PNEUMOC VAC/ADMIN/RCVD: CPT | Performed by: SPECIALIST

## 2022-02-25 PROCEDURE — G8427 DOCREV CUR MEDS BY ELIG CLIN: HCPCS | Performed by: SPECIALIST

## 2022-02-25 PROCEDURE — G8484 FLU IMMUNIZE NO ADMIN: HCPCS | Performed by: SPECIALIST

## 2022-02-25 PROCEDURE — 1090F PRES/ABSN URINE INCON ASSESS: CPT | Performed by: SPECIALIST

## 2022-02-25 PROCEDURE — 4004F PT TOBACCO SCREEN RCVD TLK: CPT | Performed by: SPECIALIST

## 2022-02-25 PROCEDURE — G8400 PT W/DXA NO RESULTS DOC: HCPCS | Performed by: SPECIALIST

## 2022-02-25 ASSESSMENT — ENCOUNTER SYMPTOMS
ANAL BLEEDING: 0
CONSTIPATION: 0
BLOOD IN STOOL: 0
ABDOMINAL DISTENTION: 0
RESPIRATORY NEGATIVE: 1
NAUSEA: 0
DIARRHEA: 1
ABDOMINAL PAIN: 0
EYES NEGATIVE: 1
RECTAL PAIN: 0
VOMITING: 0
BACK PAIN: 1

## 2022-02-25 NOTE — PROGRESS NOTES
Gastroenterology Clinic Visit    Mariah Nieto  [id number]  Chief Complaint   Patient presents with   [facility] SADE Doctor     Patient is here today because she has diarrhea daily and she reports experiencing weight gain recently. 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 Patient is reporting loss of bowel control over the past year or so. HPI: 64 y.o. female presents to the clinic with history of diarrhea dating back to her surgery about 50 years ago. Patient had intestinal bypass surgery for weight loss. Ever since she has been having diarrhea and since last 1 to 1-1/2-year symptoms has been getting more often, patient was on 1 Lomotil a day which was increased to 2/day with persistent symptoms. Frequency of BM is about 5-7 a day, has fecal urgency. Usually BM occurs after a meal.  No history of rectal bleeding. . Also has fecal incontinence. Feels bloated. No nausea or vomiting. Has been consuming significant amount of dairy products. No history of recent weight loss. No sharp abdominal pain. Social history does not smoke does not drink alcohol, surgical history includes intestinal bypass surgery    Review of Systems   Constitutional: Negative. HENT: Negative. Eyes: Negative. Respiratory: Negative. Cardiovascular: Negative. History of cardiac dysrhythmia   Gastrointestinal: Positive for diarrhea. Negative for abdominal distention, abdominal pain, anal bleeding, blood in stool, constipation, nausea, rectal pain and vomiting. Endocrine: Negative. Genitourinary: Negative. History of UTI   Musculoskeletal: Positive for back pain. Skin: Negative. Allergic/Immunologic: Negative for food allergies. Neurological: Negative. Hematological: Negative. Psychiatric/Behavioral: Negative.          Past Medical History:   Diagnosis Date    Hyperlipidemia     Hypertension     Hyperthyroidism     Spondylosis of lumbar spine       No past surgical history on file.  Current Outpatient Medications on File Prior to Visit   Medication Sig Dispense Refill    diphenoxylate-atropine (DIPHENATOL) 2.5-0.025 MG per tablet Take 2 tablets by mouth 4 times daily as needed for Diarrhea for up to 10 days. Do not exceed 8 tabs in 24 hours 40 tablet 0    HYDROcodone-acetaminophen (NORCO) 5-325 MG per tablet Take 1 tablet by mouth every 6 hours as needed for Pain for up to 30 days. Intended supply: 3 days.  Take lowest dose possible to manage pain 120 tablet 0    magnesium oxide (MAG-OX) 400 MG tablet Take 400 mg by mouth 2 times daily Indications: Nutritional Support      metoprolol tartrate (LOPRESSOR) 50 MG tablet Take 50 mg by mouth 2 times daily Indications: High Blood Pressure Disorder      DULoxetine (CYMBALTA) 60 MG extended release capsule Take 1 capsule by mouth daily (Patient taking differently: Take 60 mg by mouth daily Indications: Depression ) 120 capsule 0    omeprazole (PRILOSEC) 20 MG delayed release capsule Take 1 capsule by mouth daily (Patient taking differently: Take 20 mg by mouth daily Indications: Digestive Tract Discomfort ) 120 capsule 0    acetaminophen (ACETAMINOPHEN 8 HOUR) 650 MG extended release tablet Take 1,300 mg by mouth daily Indications: Pain       baclofen (LIORESAL) 10 MG tablet Take 10 mg by mouth daily Indications: Muscle Spasm       Artificial Saliva (BIOTENE DRY MOUTH MOISTURIZING) SOLN liquid Take 10 mLs by mouth daily as needed Place and dissolve orally bid for dry mouth, swish and spit, do not swallow      calcium carbonate-vitamin D (CALCIUM 600+D3) 600-400 MG-UNIT TABS per tab Take 1 tablet by mouth 3 times daily Indications: Nutritional Support       Multiple Vitamins-Minerals (CENTRAVITES ADULTS PO) Take 1 tablet by mouth daily Indications: Nutritional Support       furosemide (LASIX) 40 MG tablet Take 40 mg by mouth daily Indications: Edema       levothyroxine (SYNTHROID) 100 MCG tablet Take 137 mcg by mouth Daily Indications: Underactive Thyroid       loratadine (CLARITIN) 10 MG tablet Take 10 mg by mouth daily Indications: Allergy       budesonide-formoterol (SYMBICORT) 160-4.5 MCG/ACT AERO Inhale 2 puffs into the lungs 2 times daily Indications: Difficulty Breathing       vitamin A 3 MG (84361 UT) capsule Take 10,000 Units by mouth daily Indications: Nutritional Support       vitamin B-12 (CYANOCOBALAMIN) 1000 MCG tablet Take 1,000 mcg by mouth daily Indications: Nutritional Support       Cholecalciferol (VITAMIN D3) 125 MCG (5000 UT) TABS Take 5,000 Units by mouth daily Indications: Nutritional Support       zinc 50 MG TABS tablet Take 50 mg by mouth daily Indications: Nutritional Support       tamsulosin (FLOMAX) 0.4 MG capsule Take 0.4 mg by mouth daily Indications: Urinary Retention Give  x4 weeks       No current facility-administered medications on file prior to visit. No family history on file. Social History     Socioeconomic History    Marital status:      Spouse name: None    Number of children: None    Years of education: None    Highest education level: None   Occupational History    None   Tobacco Use    Smoking status: Current Every Day Smoker     Types: Cigarettes, Pipe, Cigars    Smokeless tobacco: None   Substance and Sexual Activity    Alcohol use: Never    Drug use: Never    Sexual activity: None   Other Topics Concern    None   Social History Narrative    None     Social Determinants of Health     Financial Resource Strain:     Difficulty of Paying Living Expenses: Not on file   Food Insecurity:     Worried About Running Out of Food in the Last Year: Not on file    Shaye of Food in the Last Year: Not on file   Transportation Needs:     Lack of Transportation (Medical): Not on file    Lack of Transportation (Non-Medical):  Not on file   Physical Activity: Sufficiently Active    Days of Exercise per Week: 3 days    Minutes of Exercise per Session: 60 min   Stress:  Feeling of Stress : Not on file   Social Connections:     Frequency of Communication with Friends and Family: Not on file    Frequency of Social Gatherings with Friends and Family: Not on file    Attends [employer] Services: Not on file    Active Member of Clubs or Organizations: Not on file    Attends Club or Organization Meetings: Not on file    Marital Status: Not on file   Intimate Partner Violence:     Fear of Current or Ex-Partner: Not on file    Emotionally Abused: Not on file    Physically Abused: Not on file    Sexually Abused: Not on file   Housing Stability:     Unable to Pay for Housing in the Last Year: Not on file    Number of [place] in the Last Year: Not on file    Unstable Housing in the Last Year: Not on file       Height 5' 6\" (1.676 m), weight 253 lb (114.8 kg). Physical Exam  Constitutional:       Appearance: She is well-developed. HENT:      Head: Normocephalic and atraumatic. Eyes:      Conjunctiva/sclera: Conjunctivae normal.      Pupils: Pupils are equal, round, and reactive to light. Cardiovascular:      Rate and Rhythm: Normal rate. Pulmonary:      Effort: Pulmonary effort is normal.   Abdominal:      General: Bowel sounds are normal.      Palpations: Abdomen is soft. Comments: Soft distended, dullness on both sides, nontender   Musculoskeletal:         General: Normal range of motion. Cervical back: Normal range of motion. Comments: 2+ pitting edema   Skin:     General: Skin is warm. Neurological:      Mental Status: She is alert.          Laboratory, Pathology, Radiology reviewed indetail with relevant important investigations summarized below:  Lab Results   Component Value Date    WBC 7.9 02/22/2022    HGB 10.8 (L) 02/22/2022    HCT 33.9 (L) 02/22/2022    MCV 82.6 02/22/2022     02/22/2022     Lab Results   Component Value Date    ALT 8 01/11/2022    AST 15 01/11/2022    ALKPHOS 74 01/11/2022    BILITOT 0.7 01/11/2022       No

## 2022-02-25 NOTE — PROGRESS NOTES
Patient Name: Trev Blank  YOB: 1945  Medical Record Number: [id number]      History of Present Illness: This 25-year-old woman seen in room with her  present. Patient has been recent hospitalized return to the facility for shortness of breath weakness patient blood sugars been monitored closely patient did have a breakdown in her sacral patient did have evidence of A. fib with RVR heart rate has been stabilized patient destination anticoagulation patient was doing well today to make gains with physical therapy and after discussion with nursing and therapy patient has made significant improvements at this time. Currently pain-free. Review of Systems   Constitutional: Negative for fatigue. HENT: Negative for congestion. Respiratory: Negative for shortness of breath. Gastrointestinal: Positive for constipation. Musculoskeletal: Positive for back pain. Neurological: Negative for weakness. All other systems reviewed and are negative. Review of Systems: All 14 review of systems negative other than as stated above    Social History     Tobacco Use    Smoking status: Current Every Day Smoker     Types: Cigarettes, Pipe, Cigars    Smokeless tobacco: Not on file   Substance Use Topics    Alcohol use: Never    Drug use: Never         Past Medical History:   Diagnosis Date    Hyperlipidemia     Hypertension     Hyperthyroidism     Spondylosis of lumbar spine            No past surgical history on file.       Current Outpatient Medications on File Prior to Visit   Medication Sig Dispense Refill    magnesium oxide (MAG-OX) 400 MG tablet Take 400 mg by mouth 2 times daily Indications: Nutritional Support      metoprolol tartrate (LOPRESSOR) 50 MG tablet Take 50 mg by mouth 2 times daily Indications: High Blood Pressure Disorder      tamsulosin (FLOMAX) 0.4 MG capsule Take 0.4 mg by mouth daily Indications: Urinary Retention Give  x4 weeks      DULoxetine (CYMBALTA) 60 MG extended release capsule Take 1 capsule by mouth daily (Patient taking differently: Take 60 mg by mouth daily Indications: Depression ) 120 capsule 0    omeprazole (PRILOSEC) 20 MG delayed release capsule Take 1 capsule by mouth daily (Patient taking differently: Take 20 mg by mouth daily Indications: Digestive Tract Discomfort ) 120 capsule 0    acetaminophen (ACETAMINOPHEN 8 HOUR) 650 MG extended release tablet Take 1,300 mg by mouth daily Indications: Pain       baclofen (LIORESAL) 10 MG tablet Take 10 mg by mouth daily Indications: Muscle Spasm       Artificial Saliva (BIOTENE DRY MOUTH MOISTURIZING) SOLN liquid Take 10 mLs by mouth daily as needed Place and dissolve orally bid for dry mouth, swish and spit, do not swallow      calcium carbonate-vitamin D (CALCIUM 600+D3) 600-400 MG-UNIT TABS per tab Take 1 tablet by mouth 3 times daily Indications: Nutritional Support       Multiple Vitamins-Minerals (CENTRAVITES ADULTS PO) Take 1 tablet by mouth daily Indications: Nutritional Support       furosemide (LASIX) 40 MG tablet Take 40 mg by mouth daily Indications: Edema       levothyroxine (SYNTHROID) 100 MCG tablet Take 137 mcg by mouth Daily Indications: Underactive Thyroid       loratadine (CLARITIN) 10 MG tablet Take 10 mg by mouth daily Indications: Allergy       budesonide-formoterol (SYMBICORT) 160-4.5 MCG/ACT AERO Inhale 2 puffs into the lungs 2 times daily Indications: Difficulty Breathing       vitamin A 3 MG (09846 UT) capsule Take 10,000 Units by mouth daily Indications: Nutritional Support       vitamin B-12 (CYANOCOBALAMIN) 1000 MCG tablet Take 1,000 mcg by mouth daily Indications: Nutritional Support       Cholecalciferol (VITAMIN D3) 125 MCG (5000 UT) TABS Take 5,000 Units by mouth daily Indications: Nutritional Support       zinc 50 MG TABS tablet Take 50 mg by mouth daily Indications: Nutritional Support        No current facility-administered medications on file prior to visit. Allergies   Allergen Reactions    Savella [Milnacipran]          No family history on file. Physical Exam:      Physical Exam  Vitals and nursing note reviewed. Constitutional:       Appearance: Normal appearance. She is obese. HENT:      Head: Normocephalic and atraumatic. Eyes:      Extraocular Movements: Extraocular movements intact. Cardiovascular:      Rate and Rhythm: Normal rate and regular rhythm. Pulses: Normal pulses. Heart sounds: Normal heart sounds. Pulmonary:      Effort: Pulmonary effort is normal.      Breath sounds: Normal breath sounds. Abdominal:      General: Bowel sounds are normal.      Palpations: Abdomen is soft. Musculoskeletal:         General: No swelling. Normal range of motion. Cervical back: Normal range of motion. No tenderness. Skin:     General: Skin is warm and dry. Neurological:      General: No focal deficit present. Mental Status: Mental status is at baseline. Psychiatric:         Mood and Affect: Mood normal.         There were no vitals taken for this visit.       .   Lab Results   Component Value Date    WBC 7.9 02/22/2022    HGB 10.8 (L) 02/22/2022    HCT 33.9 (L) 02/22/2022    MCV 82.6 02/22/2022     02/22/2022     Lab Results   Component Value Date     02/22/2022    K 3.6 02/22/2022     02/22/2022    CO2 17 02/22/2022    BUN 15 02/22/2022    CREATININE 1.28 02/22/2022    GLUCOSE 85 02/22/2022    GLUCOSE 71 01/16/2022    CALCIUM 9.9 02/22/2022                ASSESSMENT:  Patient Active Problem List   Diagnosis    Uncomplicated asthma    Hypothyroidism    Primary hypertension    Hyperlipidemia    Spondylosis    Fall at home    Dysphagia    Xerostomia    Atrial fibrillation (HCC)    Major depressive disorder    Chronic kidney disease    Congestive heart failure (HCC)    Gastroesophageal reflux disease    Edema    Chronic diarrhea    Chronic pain syndrome    Urinary tract infection without hematuria         PLAN:   Diagnosis Orders   1. Chronic systolic congestive heart failure ([facility] [address]     2. Chronic atrial fibrillation (HCC)     3. Primary hypertension     4. Weakness         Patient has been diuresed has been sinus rhythm. Electrosol pressure monitoring would anticipate discharge to her home setting at this time following up with outpatient physical therapy outpatient therapy.

## 2022-02-28 LAB
LACTOFERRIN, FECAL: NEGATIVE
REASON FOR REJECTION: NORMAL
REJECTED TEST: NORMAL

## 2022-02-28 ASSESSMENT — ENCOUNTER SYMPTOMS
BACK PAIN: 1
CONSTIPATION: 1
SHORTNESS OF BREATH: 0

## 2022-03-02 LAB — CELIAC PANEL: 7 UNITS (ref 0–19)

## 2022-03-03 LAB — PANCREATIC ELASTASE, FECAL: 75 UG/G

## 2022-03-07 LAB
REASON FOR REJECTION: NORMAL
REJECTED TEST: NORMAL
TSH SERPL DL<=0.05 MIU/L-ACNC: 68.09 UIU/ML (ref 0.44–3.86)

## 2022-03-15 DIAGNOSIS — R19.7 DIARRHEA, UNSPECIFIED TYPE: ICD-10-CM

## 2022-03-16 RX ORDER — DIPHENOXYLATE HYDROCHLORIDE AND ATROPINE SULFATE 2.5; .025 MG/1; MG/1
2 TABLET ORAL 4 TIMES DAILY PRN
Qty: 40 TABLET | Refills: 1 | Status: SHIPPED | OUTPATIENT
Start: 2022-03-16 | End: 2022-03-26

## 2022-04-11 ENCOUNTER — TELEPHONE (OUTPATIENT)
Dept: GASTROENTEROLOGY | Age: 77
End: 2022-04-11

## 2022-04-11 NOTE — TELEPHONE ENCOUNTER
Patient daughter is calling regarding her mothers results. She would like a call [phone].  Thanks

## 2022-04-20 ENCOUNTER — TELEPHONE (OUTPATIENT)
Dept: GASTROENTEROLOGY | Age: 77
End: 2022-04-20

## 2022-04-20 NOTE — TELEPHONE ENCOUNTER
Patient did not show up for the appointment, try to call her no answer so I called alternate number which is her son and left a message.

## 2022-05-05 ENCOUNTER — TELEPHONE (OUTPATIENT)
Dept: GASTROENTEROLOGY | Age: 77
End: 2022-05-05

## 2022-08-11 ENCOUNTER — TELEPHONE (OUTPATIENT)
Dept: GASTROENTEROLOGY | Age: 77
End: 2022-08-11

## 2022-08-11 NOTE — TELEPHONE ENCOUNTER
----- Message from Chandni Coello MD sent at 8/9/2022  4:39 PM EDT -----  Regarding: faizan  Please schedule a follow up visit. Tried calling the patient to schedule an office visit, due to her labs. No Voicemail.

## 2022-08-15 ENCOUNTER — TELEPHONE (OUTPATIENT)
Dept: GASTROENTEROLOGY | Age: 77
End: 2022-08-15

## 2022-08-15 NOTE — TELEPHONE ENCOUNTER
----- Message from Nathaniel Beavers MD sent at 8/9/2022  4:39 PM EDT -----  Please schedule a follow up visit.